# Patient Record
Sex: FEMALE | Race: WHITE | NOT HISPANIC OR LATINO | Employment: PART TIME | URBAN - METROPOLITAN AREA
[De-identification: names, ages, dates, MRNs, and addresses within clinical notes are randomized per-mention and may not be internally consistent; named-entity substitution may affect disease eponyms.]

---

## 2017-01-12 ENCOUNTER — ALLSCRIPTS OFFICE VISIT (OUTPATIENT)
Dept: OTHER | Facility: OTHER | Age: 57
End: 2017-01-12

## 2017-02-15 ENCOUNTER — ALLSCRIPTS OFFICE VISIT (OUTPATIENT)
Dept: OTHER | Facility: OTHER | Age: 57
End: 2017-02-15

## 2017-02-15 DIAGNOSIS — R31.9 HEMATURIA: ICD-10-CM

## 2017-02-15 LAB
BILIRUB UR QL STRIP: NORMAL
CLARITY UR: NORMAL
COLOR UR: YELLOW
GLUCOSE (HISTORICAL): NORMAL
HGB UR QL STRIP.AUTO: NORMAL
KETONES UR STRIP-MCNC: NORMAL MG/DL
LEUKOCYTE ESTERASE UR QL STRIP: NORMAL
NITRITE UR QL STRIP: NORMAL
PH UR STRIP.AUTO: 8 [PH]
PROT UR STRIP-MCNC: NORMAL MG/DL
SP GR UR STRIP.AUTO: 1.01
UROBILINOGEN UR QL STRIP.AUTO: NORMAL

## 2017-02-17 ENCOUNTER — GENERIC CONVERSION - ENCOUNTER (OUTPATIENT)
Dept: OTHER | Facility: OTHER | Age: 57
End: 2017-02-17

## 2017-02-17 ENCOUNTER — HOSPITAL ENCOUNTER (OUTPATIENT)
Dept: RADIOLOGY | Facility: HOSPITAL | Age: 57
Discharge: HOME | End: 2017-02-17
Attending: FAMILY MEDICINE
Payer: COMMERCIAL

## 2017-02-17 ENCOUNTER — TRANSCRIBE ORDERS (OUTPATIENT)
Dept: LAB | Facility: CLINIC | Age: 57
End: 2017-02-17

## 2017-02-17 ENCOUNTER — APPOINTMENT (OUTPATIENT)
Dept: LAB | Facility: CLINIC | Age: 57
End: 2017-02-17
Payer: COMMERCIAL

## 2017-02-17 DIAGNOSIS — E04.2 NONTOXIC MULTINODULAR GOITER: Primary | ICD-10-CM

## 2017-02-17 DIAGNOSIS — R31.9 HEMATURIA: ICD-10-CM

## 2017-02-17 LAB — VENIPUNCTURE: NORMAL

## 2017-02-17 PROCEDURE — 76770 US EXAM ABDO BACK WALL COMP: CPT

## 2017-02-17 PROCEDURE — 36415 COLL VENOUS BLD VENIPUNCTURE: CPT | Performed by: FAMILY MEDICINE

## 2017-02-20 ENCOUNTER — GENERIC CONVERSION - ENCOUNTER (OUTPATIENT)
Dept: OTHER | Facility: OTHER | Age: 57
End: 2017-02-20

## 2017-02-21 LAB
INTERPRETATION (HISTORICAL): NORMAL
QUESTION/PROBLEM (HISTORICAL): NORMAL

## 2017-02-23 LAB
CLINICIAN PROVIDIED ICD 9 OR 10 (HISTORICAL): NORMAL
DIAGNOSIS (HISTORICAL): NORMAL
ELECTRONICALLY SIGNED BY (HISTORICAL): NORMAL
GROSS DESCRIPTION (HISTORICAL): NORMAL
PERFORMED BY (HISTORICAL): NORMAL
RECOMMENDATION (HISTORICAL): NORMAL
SOURCE (HISTORICAL): NORMAL

## 2017-03-01 ENCOUNTER — GENERIC CONVERSION - ENCOUNTER (OUTPATIENT)
Dept: OTHER | Facility: OTHER | Age: 57
End: 2017-03-01

## 2017-04-04 ENCOUNTER — ALLSCRIPTS OFFICE VISIT (OUTPATIENT)
Dept: OTHER | Facility: OTHER | Age: 57
End: 2017-04-04

## 2017-04-13 ENCOUNTER — TRANSCRIBE ORDERS (OUTPATIENT)
Dept: LAB | Facility: CLINIC | Age: 57
End: 2017-04-13

## 2017-04-13 ENCOUNTER — APPOINTMENT (OUTPATIENT)
Dept: LAB | Facility: CLINIC | Age: 57
End: 2017-04-13
Payer: COMMERCIAL

## 2017-04-13 DIAGNOSIS — B35.1 DERMATOPHYTOSIS OF NAIL: Primary | ICD-10-CM

## 2017-04-13 LAB — VENIPUNCTURE: NORMAL

## 2017-04-13 PROCEDURE — 36415 COLL VENOUS BLD VENIPUNCTURE: CPT | Performed by: FAMILY MEDICINE

## 2017-04-14 LAB
A/G RATIO (HISTORICAL): 2 (ref 1.2–2.2)
ALBUMIN SERPL BCP-MCNC: 4.6 G/DL (ref 3.5–5.5)
ALP SERPL-CCNC: 51 IU/L (ref 39–117)
ALT SERPL W P-5'-P-CCNC: 20 IU/L (ref 0–32)
AST SERPL W P-5'-P-CCNC: 18 IU/L (ref 0–40)
BILIRUB SERPL-MCNC: 0.5 MG/DL (ref 0–1.2)
BUN SERPL-MCNC: 24 MG/DL (ref 6–24)
BUN/CREA RATIO (HISTORICAL): 28 (ref 9–23)
CALCIUM SERPL-MCNC: 11.3 MG/DL (ref 8.7–10.2)
CHLORIDE SERPL-SCNC: 97 MMOL/L (ref 96–106)
CO2 SERPL-SCNC: 27 MMOL/L (ref 18–29)
CREAT SERPL-MCNC: 0.87 MG/DL (ref 0.57–1)
EGFR AFRICAN AMERICAN (HISTORICAL): 84 ML/MIN/1.73
EGFR-AMERICAN CALC (HISTORICAL): 73 ML/MIN/1.73
GLUCOSE SERPL-MCNC: 103 MG/DL (ref 65–99)
POTASSIUM SERPL-SCNC: 4.6 MMOL/L (ref 3.5–5.2)
SODIUM SERPL-SCNC: 139 MMOL/L (ref 134–144)
TOT. GLOBULIN, SERUM (HISTORICAL): 2.3 G/DL (ref 1.5–4.5)
TOTAL PROTEIN (HISTORICAL): 6.9 G/DL (ref 6–8.5)

## 2017-04-17 ENCOUNTER — GENERIC CONVERSION - ENCOUNTER (OUTPATIENT)
Dept: OTHER | Facility: OTHER | Age: 57
End: 2017-04-17

## 2017-04-29 ENCOUNTER — GENERIC CONVERSION - ENCOUNTER (OUTPATIENT)
Dept: OTHER | Facility: OTHER | Age: 57
End: 2017-04-29

## 2017-04-29 LAB
A/G RATIO (HISTORICAL): 2 (ref 1.2–2.2)
ALBUMIN SERPL BCP-MCNC: 4.5 G/DL (ref 3.5–5.5)
ALP SERPL-CCNC: 49 IU/L (ref 39–117)
ALT SERPL W P-5'-P-CCNC: 19 IU/L (ref 0–32)
AST SERPL W P-5'-P-CCNC: 19 IU/L (ref 0–40)
BILIRUB SERPL-MCNC: 0.3 MG/DL (ref 0–1.2)
BUN SERPL-MCNC: 20 MG/DL (ref 6–24)
BUN/CREA RATIO (HISTORICAL): 24 (ref 9–23)
CALCIUM SERPL-MCNC: 9.1 MG/DL (ref 8.7–10.2)
CHLORIDE SERPL-SCNC: 103 MMOL/L (ref 96–106)
CO2 SERPL-SCNC: 21 MMOL/L (ref 18–29)
CREAT SERPL-MCNC: 0.83 MG/DL (ref 0.57–1)
EGFR AFRICAN AMERICAN (HISTORICAL): 89 ML/MIN/1.73
EGFR-AMERICAN CALC (HISTORICAL): 77 ML/MIN/1.73
GLUCOSE SERPL-MCNC: 96 MG/DL (ref 65–99)
POTASSIUM SERPL-SCNC: 5 MMOL/L (ref 3.5–5.2)
SODIUM SERPL-SCNC: 140 MMOL/L (ref 134–144)
TOT. GLOBULIN, SERUM (HISTORICAL): 2.2 G/DL (ref 1.5–4.5)
TOTAL PROTEIN (HISTORICAL): 6.7 G/DL (ref 6–8.5)

## 2017-04-30 LAB
25(OH)D3 SERPL-MCNC: 28.2 NG/ML (ref 30–100)
PTH-INTACT SERPL-MCNC: 43 PG/ML (ref 15–65)

## 2017-05-01 ENCOUNTER — GENERIC CONVERSION - ENCOUNTER (OUTPATIENT)
Dept: OTHER | Facility: OTHER | Age: 57
End: 2017-05-01

## 2017-05-01 ENCOUNTER — TRANSCRIBE ORDERS (OUTPATIENT)
Dept: ADMINISTRATIVE | Facility: HOSPITAL | Age: 57
End: 2017-05-01

## 2017-05-01 DIAGNOSIS — Z12.31 VISIT FOR SCREENING MAMMOGRAM: Primary | ICD-10-CM

## 2017-05-01 LAB — VITAMIN D 1,25-DIHYDROXY (HISTORICAL): 35.5 PG/ML (ref 19.9–79.3)

## 2017-05-08 ENCOUNTER — HOSPITAL ENCOUNTER (OUTPATIENT)
Dept: RADIOLOGY | Facility: HOSPITAL | Age: 57
Discharge: HOME | End: 2017-05-08
Attending: OBSTETRICS & GYNECOLOGY
Payer: COMMERCIAL

## 2017-05-08 DIAGNOSIS — Z12.31 VISIT FOR SCREENING MAMMOGRAM: ICD-10-CM

## 2017-05-08 PROCEDURE — G0202 SCR MAMMO BI INCL CAD: HCPCS

## 2017-06-08 ENCOUNTER — GENERIC CONVERSION - ENCOUNTER (OUTPATIENT)
Dept: OTHER | Facility: OTHER | Age: 57
End: 2017-06-08

## 2017-07-17 ENCOUNTER — ALLSCRIPTS OFFICE VISIT (OUTPATIENT)
Dept: OTHER | Facility: OTHER | Age: 57
End: 2017-07-17

## 2017-08-02 ENCOUNTER — ALLSCRIPTS OFFICE VISIT (OUTPATIENT)
Dept: OTHER | Facility: OTHER | Age: 57
End: 2017-08-02

## 2017-08-02 ENCOUNTER — GENERIC CONVERSION - ENCOUNTER (OUTPATIENT)
Dept: OTHER | Facility: OTHER | Age: 57
End: 2017-08-02

## 2017-08-02 LAB — ERYTHROCYTE SEDIMENTATION RATE (HISTORICAL): 4 MM/HR (ref 0–40)

## 2017-08-03 LAB
ANTINUCLEAR ANTIBODIES, IFA (HISTORICAL): NEGATIVE
LYME IGG/IGM AB (HISTORICAL): <0.91 ISR (ref 0–0.9)
LYME IGM (HISTORICAL): <0.8 INDEX (ref 0–0.79)
RA LATEX TURBID (HISTORICAL): 11.5 IU/ML (ref 0–13.9)

## 2017-08-07 ENCOUNTER — GENERIC CONVERSION - ENCOUNTER (OUTPATIENT)
Dept: OTHER | Facility: OTHER | Age: 57
End: 2017-08-07

## 2017-10-10 ENCOUNTER — ANESTHESIA EVENT (OUTPATIENT)
Dept: GASTROENTEROLOGY | Facility: AMBULARY SURGERY CENTER | Age: 57
End: 2017-10-10
Payer: COMMERCIAL

## 2017-10-10 RX ORDER — LISINOPRIL 2.5 MG/1
2.5 TABLET ORAL EVERY MORNING
COMMUNITY
End: 2018-03-19 | Stop reason: SDUPTHER

## 2017-10-10 RX ORDER — ESTRADIOL 0.1 MG/D
1 FILM, EXTENDED RELEASE TRANSDERMAL 2 TIMES WEEKLY
COMMUNITY
End: 2018-06-21 | Stop reason: ALTCHOICE

## 2017-10-10 RX ORDER — ROPINIROLE 0.5 MG/1
0.5 TABLET, FILM COATED ORAL 3 TIMES DAILY
COMMUNITY

## 2017-10-10 NOTE — ANESTHESIA PREPROCEDURE EVALUATION
Hypertension    Disease of thyroid gland nodules   Arthritis            Review of Systems/Medical History  Patient summary reviewed  Chart reviewed      Cardiovascular  Hypertension ,    Pulmonary  Negative pulmonary ROS ,        GI/Hepatic    Bowel prep            Endo/Other  History of thyroid disease , Arthritis     GYN  Negative gynecology ROS          Hematology  Negative hematology ROS      Musculoskeletal  Osteoarthritis,   Comment: Cervical dystonia, head turns to the left        Neurology   Psychology   Negative psychology ROS            Physical Exam    Airway    Mallampati score: II  TM Distance: >3 FB  Neck ROM: full     Dental       Cardiovascular  Rhythm: regular, Rate: normal,     Pulmonary  Breath sounds clear to auscultation,     Other Findings        Anesthesia Plan  ASA Score- 3       Anesthesia Type- IV sedation with anesthesia with ASA Monitors  Additional Monitors:   Airway Plan:           Induction- intravenous  Informed Consent- Anesthetic plan and risks discussed with patient

## 2017-10-10 NOTE — PRE-PROCEDURE INSTRUCTIONS
Pre-Surgery Instructions:   Medication Instructions    estradiol (VIVELLE-DOT) 0 1 MG/24HR Patient was instructed by Physician and understands   lisinopril (ZESTRIL) 2 5 mg tablet Patient was instructed by Physician and understands   rOPINIRole (REQUIP) 0 5 mg tablet Patient was instructed by Physician and understands

## 2017-10-11 ENCOUNTER — ANESTHESIA (OUTPATIENT)
Dept: GASTROENTEROLOGY | Facility: AMBULARY SURGERY CENTER | Age: 57
End: 2017-10-11
Payer: COMMERCIAL

## 2017-10-11 ENCOUNTER — HOSPITAL ENCOUNTER (OUTPATIENT)
Facility: AMBULARY SURGERY CENTER | Age: 57
Setting detail: OUTPATIENT SURGERY
Discharge: HOME | End: 2017-10-11
Attending: INTERNAL MEDICINE | Admitting: INTERNAL MEDICINE
Payer: COMMERCIAL

## 2017-10-11 ENCOUNTER — GENERIC CONVERSION - ENCOUNTER (OUTPATIENT)
Dept: OTHER | Facility: OTHER | Age: 57
End: 2017-10-11

## 2017-10-11 VITALS
HEART RATE: 60 BPM | RESPIRATION RATE: 18 BRPM | OXYGEN SATURATION: 100 % | BODY MASS INDEX: 22.44 KG/M2 | TEMPERATURE: 96.8 F | SYSTOLIC BLOOD PRESSURE: 152 MMHG | WEIGHT: 143 LBS | DIASTOLIC BLOOD PRESSURE: 72 MMHG | HEIGHT: 67 IN

## 2017-10-11 RX ORDER — PROPOFOL 10 MG/ML
INJECTION, EMULSION INTRAVENOUS AS NEEDED
Status: DISCONTINUED | OUTPATIENT
Start: 2017-10-11 | End: 2017-10-11 | Stop reason: SURG

## 2017-10-11 RX ORDER — SODIUM CHLORIDE, SODIUM LACTATE, POTASSIUM CHLORIDE, CALCIUM CHLORIDE 600; 310; 30; 20 MG/100ML; MG/100ML; MG/100ML; MG/100ML
125 INJECTION, SOLUTION INTRAVENOUS CONTINUOUS
Status: DISCONTINUED | OUTPATIENT
Start: 2017-10-11 | End: 2017-10-11 | Stop reason: HOSPADM

## 2017-10-11 RX ADMIN — SODIUM CHLORIDE, SODIUM LACTATE, POTASSIUM CHLORIDE, AND CALCIUM CHLORIDE: .6; .31; .03; .02 INJECTION, SOLUTION INTRAVENOUS at 09:46

## 2017-10-11 RX ADMIN — PROPOFOL 150 MG: 10 INJECTION, EMULSION INTRAVENOUS at 09:55

## 2017-10-11 RX ADMIN — SODIUM CHLORIDE, SODIUM LACTATE, POTASSIUM CHLORIDE, AND CALCIUM CHLORIDE 125 ML/HR: .6; .31; .03; .02 INJECTION, SOLUTION INTRAVENOUS at 09:50

## 2017-10-11 RX ADMIN — PROPOFOL 50 MG: 10 INJECTION, EMULSION INTRAVENOUS at 10:00

## 2017-10-11 NOTE — OP NOTE
COLONOSCOPY    PROCEDURE: Colonoscopy    INDICATIONS: Screening for Colon Cancer    POST-OP DIAGNOSIS: See the impression below    SEDATION: Monitored anesthesia care, check anesthesia records    PHYSICAL EXAM:    /72   Pulse 76   Temp (!) 96 8 °F (36 °C) (Tympanic)   Resp 16   Ht 5' 7" (1 702 m)   Wt 64 9 kg (143 lb)   SpO2 100%   BMI 22 40 kg/m²   Body mass index is 22 4 kg/m²  General: NAD  Heart: S1 & S2 normal, RRR  Lungs: CTA, No rales or rhonchi  Abdomen: Soft, nontender, nondistended, good bowel sounds    CONSENT:  Informed consent was obtained for the procedure, including sedation after explaining the risks and benefits of the procedure  Risks including but not limited to bleeding, perforation, infection, aspiration were discussed in detail  Also explained about less than 100%$ sensitivity with the exam and other alternatives  PREPARATION:   EKG tracing, pulse oximetry, blood pressure were monitored throughout the procedure  Patient was identified by myself both verbally and by visual inspection of ID band  DESCRIPTION:   Patient was placed in the left lateral decubitus position and was sedated with the above medication  Digital rectal examination was performed  The colonoscope was introduced in to the anal canal and advanced up to cecum, which was identified by the appendiceal orifice and IC valve  A careful inspection was made as the colonoscope was withdrawn, including a retroflexed view of the rectum; findings and interventions are described below  Appropriate photodocumentation was obtained  The quality of the colonic preparation was adequate  FINDINGS:    1  Cecum and ileocecal valve-normal mucosa    2  The rest of the colon mucosa is normal  No evidence of any polyps or other lesions  IMPRESSIONS:      As above    RECOMMENDATIONS:    Next colonoscopy in 10 years    COMPLICATIONS:  None; patient tolerated the procedure well      DISPOSITION: PACU CONDITION: Stable

## 2017-10-11 NOTE — ANESTHESIA POSTPROCEDURE EVALUATION
Post-Op Assessment Note      CV Status:  Stable    Mental Status:  Awake and alert    Hydration Status:  Stable    PONV Controlled:  None    Airway Patency:  Patent    Post Op Vitals Reviewed: Yes          Staff: Anesthesiologist           BP      Temp     Pulse     Resp      SpO2

## 2017-10-11 NOTE — H&P
History and Physical - [facility]  Jacob Signs 59 y o  female MRN: [id number]        HPI:  33-year-old female with history of hypertension was referred for colonoscopy  Patient had colonoscopy many years ago and she thinks about 10 years  Patient has regular bowel movements and denies any blood or mucus in the stool  Appetite is good and denies any recent weight loss  Denies any abdominal pain, nausea, or vomiting  Has no heartburn or acid reflux  Denies any difficulty swallowing  Historical Information   Past Medical History:   Diagnosis Date    Arthritis     Cervical dystonia     head turns to the left    Disease of thyroid gland     nodules    Hypertension      Past Surgical History:   Procedure Laterality Date    BREAST SURGERY Left     cyst benign     SECTION      bikini    DILATION AND CURETTAGE OF UTERUS      HYSTERECTOMY       Social History   History   Alcohol Use    Yes     Comment: socially     History   Drug Use No     History   Smoking Status    Former Smoker    Quit date:    Smokeless Tobacco    Never Used     Family History   Problem Relation Age of Onset    Hypertension Mother     Hyperlipidemia Mother     Cancer Father      lung and brain    Diverticulitis Sister     No Known Problems Daughter     Diverticulitis Paternal Grandmother     Suicide Attempts Sister     No Known Problems Daughter        Meds/Allergies     Prescriptions Prior to Admission   Medication    estradiol (VIVELLE-DOT) 0 1 MG/24HR    lisinopril (ZESTRIL) 2 5 mg tablet    rOPINIRole (REQUIP) 0 5 mg tablet       No Known Allergies    Objective     Blood pressure 130/72, pulse 76, temperature (!) 96 8 °F (36 °C), temperature source Tympanic, resp  rate 16, height 5' 7" (1 702 m), weight 64 9 kg (143 lb), SpO2 100 %      PHYSICAL EXAM:    Gen: NAD  CV: S1 & S2 normal, RRR  CHEST: Clear to auscultate  ABD: soft, NT/ND, good bowel sounds  EXT: no edema    ASSESSMENT: Screening for colon cancer    PLAN:    Colonoscopy

## 2017-10-16 ENCOUNTER — ALLSCRIPTS OFFICE VISIT (OUTPATIENT)
Dept: OTHER | Facility: OTHER | Age: 57
End: 2017-10-16

## 2018-01-10 NOTE — RESULT NOTES
Discussion/Summary   Your labs are stable  Please follow up as we discussed at your last appointment  Dr Krystal Carrillo      Verified Results  [facility]) Sedimentation Rate-Westergren [id number] 02:21PM MR Presta     Test Name Result Flag Reference   Sedimentation Rate-Westergren 4 mm/hr  0-40     (LC) Rheumatoid Arthritis Factor 02Aug2017 02:21PM MR Presta     Test Name Result Flag Reference   RA Latex Turbid  11 5 IU/mL  0 0-13 9     (LC) Lyme Ab/Western Blot Reflex 02Aug2017 02:21PM MR Presta     Test Name Result Flag Reference   Lyme IgG/IgM Ab <0 91 ISR  0 00-0 90   Negative         <0 91                                                 Equivocal  0 91 - 1 09                                                 Positive         >1 09   Lyme Disease Ab, Quant, IgM <0 80 index  0 00-0 79   Negative         <0 80                                                 Equivocal  0 80 - 1 19                                                 Positive         >1 19                  IgM levels may peak at 3-6 weeks post infection, then                  gradually decline       (LC) Antinuclear Antibodies, IFA [id number] 02:21PM MR Presta     Test Name Result Flag Reference   Antinuclear Antibodies, IFA Negative     Negative   <1:80                                                      Borderline  1:80                                                      Positive   >1:80

## 2018-01-10 NOTE — RESULT NOTES
Discussion/Summary   Your labs are stable  Please follow up as we discussed at your last appointment  Dr Elina Guzman      Verified Results  (1) COMPREHENSIVE METABOLIC PANEL [id number] 21:50PI Honey Darnell     Test Name Result Flag Reference   Glucose, Serum 96 mg/dL  65-99   BUN 20 mg/dL  6-24   Creatinine, Serum 0 83 mg/dL  0 57-1 00   BUN/Creatinine Ratio 24 H 9-23   Sodium, Serum 140 mmol/L  134-144   Potassium, Serum 5 0 mmol/L  3 5-5 2   Chloride, Serum 103 mmol/L     Carbon Dioxide, Total 21 mmol/L  18-29   Calcium, Serum 9 1 mg/dL  8 7-10 2   Protein, Total, Serum 6 7 g/dL  6 0-8 5   Albumin, Serum 4 5 g/dL  3 5-5 5   Globulin, Total 2 2 g/dL  1 5-4 5   A/G Ratio 2 0  1 2-2 2   Bilirubin, Total 0 3 mg/dL  0 0-1 2   Alkaline Phosphatase, S 49 IU/L     AST (SGOT) 19 IU/L  0-40   ALT (SGPT) 19 IU/L  0-32   eGFR If NonAfricn Am 77 mL/min/1 73  >59   eGFR If Africn Am 89 mL/min/1 73  >59     (1) PTH N-TERMINAL (INTACT) 29Apr2017 07:47AM Honey Darnell     Test Name Result Flag Reference   PTH, Intact 43 pg/mL  15-65     (1) VITAMIN D 25-HYDROXY [id number] 07:47AM Honey Darnell     Test Name Result Flag Reference   Vitamin D, 25-Hydroxy 28 2 ng/mL L 30 0-100 0   Vitamin D deficiency has been defined by the [place] of  Medicine and an Endocrine Society practice guideline as a  level of serum 25-OH vitamin D less than 20 ng/mL (1,2)  The Endocrine Society went on to further define vitamin D  insufficiency as a level between 21 and 29 ng/mL (2)  1  IOM ([place] of Medicine)  2010  Dietary reference     intakes for calcium and D  [facility]: The     [employer]el[employer]  2  Ilene MF, Trino DONALDSON, Annia GUARDADO, et al      Evaluation, treatment, and prevention of vitamin D     deficiency: an Endocrine Society clinical practice     guideline  JCEM  2011 Jul; 96(7):1911-30

## 2018-01-11 NOTE — RESULT NOTES
Verified Results  [facility]) Urine Cytology [id number] 12:00AM Yoli Saleh   No  of containers  Marcine Grow 01 Sterile Cup     Test Name Result Flag Reference   Source: Comment     URINE   Clinician provided ICD10: Comment     R31 9  E04 2  Z13 0  Z13 1  Z13 220  E07 9   DIAGNOSIS: Comment     URINE  NEGATIVE FOR MALIGNANT CELLS  FEW TRANSITIONAL EPITHELIAL CELLS ARE PRESENT  PREDOMINANTLY SQUAMOUS CELLS ARE PRESENT  Recommendation: Comment     Suggest follow up as clinically appropriate     Signed out by: Kayy Sheppard MD, Pathologist   Performed by: Nick Jaime, Cytotechnologist (ASCP)   Gross description: Comment     100 CC, LIGHT YELLOW, FIXED FLD  /LCS

## 2018-01-11 NOTE — RESULT NOTES
Verified Results  [address] [id number] 07:47AM Taisha  Order Number: [id number]    - Patient Instructions: To schedule this appointment, please contact Central Scheduling at [phone]  Test Name Result Flag Reference   US KIDNEY AND BLADDER (Report)     RENAL ULTRASOUND     INDICATION: 13-year-old female with history of hematuria  COMPARISON: None  TECHNIQUE:  Ultrasound of the retroperitoneum was performed with a curvilinear transducer utilizing volumetric sweeps and still imaging techniques  FINDINGS:     KIDNEYS:   Symmetric and normal size  Right kidney: 9 9 x 4 9 cm  Normal echogenicity and contour  No suspicious masses detected  No hydronephrosis  No shadowing calculi  No perinephric fluid collections  Left kidney: 10 3 x 5 2 cm  Normal echogenicity and contour  No suspicious masses detected  A small cyst is noted at the upper pole measuring about 6 mm  It appears anechoic and simple  No hydronephrosis  No shadowing calculi  No perinephric fluid collections  URETERS:   Nonvisualized  BLADDER:    Normally distended  No focal thickening or mass lesions  Bilateral ureteral jets detected  IMPRESSION:     Tiny left renal cyst  No kidney stones are visible  No visible masses  If the patient has recurrent unexplained hematuria, renal protocol CT might provide increased sensitivity for detection of lesions          Workstation performed: [id number]     Signed by:   Janie Watson MD   2/17/17       Discussion/Summary   please call with any further questions/concerns-best regards-Dr Thierry Barrios

## 2018-01-12 VITALS
HEART RATE: 72 BPM | BODY MASS INDEX: 22.35 KG/M2 | HEIGHT: 67 IN | TEMPERATURE: 97.4 F | DIASTOLIC BLOOD PRESSURE: 80 MMHG | RESPIRATION RATE: 16 BRPM | WEIGHT: 142.4 LBS | SYSTOLIC BLOOD PRESSURE: 130 MMHG

## 2018-01-13 VITALS
DIASTOLIC BLOOD PRESSURE: 70 MMHG | WEIGHT: 141.4 LBS | TEMPERATURE: 97.4 F | SYSTOLIC BLOOD PRESSURE: 130 MMHG | BODY MASS INDEX: 22.19 KG/M2 | HEART RATE: 72 BPM | HEIGHT: 67 IN | RESPIRATION RATE: 16 BRPM

## 2018-01-13 NOTE — PROGRESS NOTES
Assessment    1  Well adult on routine health check (V70 0) (Z00 00)   2  BMI 23 0-23 9, adult (V85 1) (Z68 23)    Plan  Attention deficit disorder without hyperactivity    · Amphetamine-Dextroamphetamine 20 MG Oral Tablet (Adderall); take 1 tablet  every twelve hours  Benign essential hypertension, Hematuria, Hypercalcemia, Polyarthralgia, Thyroid  disorder    · (1) CBC/PLT/DIFF; Status:Active; Requested for:16Oct2017;    · (1) COMPREHENSIVE METABOLIC PANEL; Status:Active; Requested for:16Oct2017;    · (1) LIPID PANEL FASTING W DIRECT LDL REFLEX; Status:Active; Requested  for:16Oct2017;    · (1) TSH; Status:Active; Requested for:16Oct2017;    · (1) URINALYSIS (will reflex a microscopy if leukocytes, occult blood, protein or nitrites  are not within normal limits); Status:Active; Requested for:16Oct2017;    · (1) VITAMIN D 25-HYDROXY; Status:Active; Requested for:16Oct2017;   Encounter for screening for malignant neoplasm of colon    · Suprep Bowel Prep Kit 17 5-3 13-1 6 GM/180ML Oral Solution  Need for influenza vaccination    · Fluzone Quadrivalent 0 5 ML Intramuscular Suspension Prefilled Syringe;  INJECT 0 5  ML Intramuscular; To Be Done: 59PKE1555  Polyarthralgia    · 3 - [facility]  *  Status: Hold For - Scheduling   Requested for: 56XOI7808  are Referring to a non- Preferred Provider : Scheduling access issues  Care Summary provided  : Yes  Postmenopausal    · Estradiol 0 1 MG/24HR Transdermal Patch Weekly; APPLY 1 PATCH WEEKLY AS  DIRECTED  Well adult on routine health check    · Always use a seat belt and shoulder strap when riding or driving a motor vehicle ;  Status:Complete;   Done: 00HXZ5798   · Begin or continue regular aerobic exercise   Gradually work up to at least 3 sessions of 30  minutes of exercise a week ; Status:Complete;   Done: 49VXZ1473   · Eat a low fat and low cholesterol diet ; Status:Complete;   Done: 47PMY5706   · Regular aerobic exercise can help reduce stress ; Status:Complete;   Done: 90NBX7482   · Vitamins can help you get daily requirements that your diet may not be giving you ;  Status:Complete;   Done: 24KND9636   · We recommend that you follow these steps to lower your risk of osteoporosis  ;  Status:Complete;   Done: 32FDJ9434   · We recommend that you get 400 IU of Vitamin D each day ; Status:Complete;   Done:  65RQJ3279    Discussion/Summary  health maintenance visit Currently, she eats a healthy diet and has an adequate exercise regimen  gyn Advice and education were given regarding nutrition, weight bearing exercise, calcium supplements and vitamin D supplements  Will give referral for Dr Jaime Kimble [facility]  The patient was counseled regarding instructions for management, risk factor reductions, patient and family education, impressions, importance of compliance with treatment  Chief Complaint  patient presenting for her annual physical , declined FLu vaccine sl/cma      History of Present Illness  HM, Adult Female: The patient is being seen for a health maintenance evaluation  The last health maintenance visit was 1 year(s) ago  General Health: The patient's health since the last visit is described as good  She has regular dental visits  She denies vision problems  She denies hearing loss  Immunizations status:  declined flu  Lifestyle:  She consumes a diverse and healthy diet  She does not have any weight concerns  She exercises regularly  She does not use tobacco  She denies alcohol use  She denies drug use  Reproductive health:  s/p hyster---sees gyn  Screening: cancer screening reviewed and updated  metabolic screening reviewed and updated  risk screening reviewed and updated  Additional History:  had c-scope last week--- no polyps  has arthritis and feeling very uncomfortable  pt wants to be off medication  last mammo in may 2017  saw gyn 2 months ago             Review of Systems    Constitutional: not feeling poorly and not feeling tired  Eyes: no eyesight problems  ENT: no hearing loss  Cardiovascular: No complaints of slow heart rate, no fast heart rate, no chest pain, no palpitations, no leg claudication, no lower extremity edema  Respiratory: No complaints of shortness of breath, no wheezing, no cough, no SOB on exertion, no orthopnea, no PND  Gastrointestinal: no change in bowel or bladder habits  Musculoskeletal: arthralgias and joint stiffness  Integumentary: no rashes  Neurological: dystonia and tremor-- both are stable  Psychiatric: no anxiety and no depression  Active Problems    1  Arthritis (716 90) (M19 90)   2  Attention deficit disorder without hyperactivity (314 00) (F98 8)   3  Benign essential hypertension (401 1) (I10)   4  Benign head tremor (333 1) (G25 0)   5  Blood in the urine (599 70) (R31 9)   6  Contracture of joint of right hand (718 44) (M24 541)   7  Encounter for screening for malignant neoplasm of colon (V76 51) (Z12 11)   8  Encounter for screening mammogram for breast cancer (V76 12) (Z12 31)   9  Fatigue (780 79) (R53 83)   10  Hematuria (599 70) (R31 9)   11  Hypercalcemia (275 42) (E83 52)   12  Multiple thyroid nodules (241 1) (E04 2)   13  Need for influenza vaccination (V04 81) (Z23)   14  Onychomycosis (110 1) (B35 1)   15  Polyarthralgia (719 49) (M25 50)   16  Screening for breast cancer (V76 10) (Z12 31)   17  Spastic torticollis (333 83) (M43 6)   18  Thyroid disorder (246 9) (E07 9)   19   Trigger little finger of right hand (727 03) (M65 351)    Past Medical History    · History of Accelerated essential hypertension (401 0) (I10)   · History of Acute conjunctivitis of left eye, unspecified acute conjunctivitis type (372 00)  (H10 32)   · History of Body mass index (BMI) 22 0-22 9, adult (V85 1) (Z68 22)   · History of Body mass index (BMI) 23 0-23 9, adult (V85 1) (M93 40)   · History of interstitial nephritis (V13 09) (Z87 448)   · History of Intermittent torticollis (723 5) (M43 6)   · History of Lateral epicondylitis of right elbow (726 32) (M77 11)   · History of Myalgia (729 1) (M79 1)   · History of Need for diphtheria-tetanus-pertussis (Tdap) vaccine, adult/adolescent (V06 1)  (Z23)   · History of Need for hepatitis C screening test (V73 89) (Z11 59)   · History of Onychomycosis of toenail (110 1) (B35 1)   · History of Pain, hand joint, right (719 44) (M79 641)   · History of Paronychia of great toe, right (681 11) (L03 031)   · History of Poison ivy dermatitis (692 6) (L23 7)    Surgical History    · History of  Section   · History of Hysterectomy   · History of Total Abdominal Hysterectomy    Family History  Mother    · Denied: Family history of Crohn's disease without complication, unspecified  gastrointestinal tract location   · Family history of arthritis (V17 7) (Z82 61)   · Family history of hypertension (V17 49) (Z82 49)   · Denied: Family history of liver disease   · Family history of osteoporosis (V17 81) (Z82 62)   · Denied: Family history of Malignant neoplasm of colon, unspecified part of colon  Father    · Denied: Family history of Crohn's disease without complication, unspecified  gastrointestinal tract location   · Denied: Family history of liver disease   · Family history of malignant neoplasm (V16 9) (Z80 9)   · Denied: Family history of Malignant neoplasm of colon, unspecified part of colon  Family History    · Family history of colitis (V18 59) (Z83 79)    Social History    · Alcohol ingestion of one to four drinks per week (V69 8) (Z78 9)   · Caffeine use (V49 89) (F15 90)   · Former smoker (V15 82) (G49 142)   · Occasional alcohol use    Current Meds   1  Amphetamine-Dextroamphetamine 20 MG Oral Tablet; take 1 tablet every twelve hours; Last Rx:78Tkn7913 Ordered   2  Lisinopril 5 MG Oral Tablet; Take 1 tablet daily; Therapy: 49Sks0386 to (Evaluate:57Lmd1007)  Requested for: 95ILE8809; Last   Rx:58Yyl7766 Ordered   3  ROPINIRole HCl - 0 25 MG Oral Tablet; Therapy: (Recorded:01Oct2015) to Recorded   4  Suprep Bowel Prep Kit 17 5-3 13-1 6 GM/180ML Oral Solution; USE AS DIRECTED; Therapy: 85ZJO9483 to (Last [facility])  Requested for: 60XFO0109 Ordered    Allergies    1  No Known Drug Allergies    2  No Known Environmental Allergies   3  No Known Food Allergies    Vitals   Recorded: 03UZZ4114 01:03PM   Temperature 97 2 F   Heart Rate 72   Respiration 16   Systolic 777   Diastolic 80   Height 5 ft 7 in   Weight 147 lb 3 2 oz   BMI Calculated 23 05   BSA Calculated 1 78     Physical Exam    Constitutional   General appearance: No acute distress, well appearing and well nourished  Eyes   Conjunctiva and lids: No swelling, erythema or discharge  Pupils and irises: Equal, round, reactive to light  Ears, Nose, Mouth, and Throat   External inspection of ears and nose: Normal     Otoscopic examination: Tympanic membranes translucent with normal light reflex  Canals patent without erythema  Lips, teeth, and gums: Normal, good dentition  Oropharynx: Normal with no erythema, edema, exudate or lesions  Neck   Neck: Supple, symmetric, trachea midline, no masses  Thyroid: Normal, no thyromegaly  Pulmonary   Respiratory effort: No increased work of breathing or signs of respiratory distress  Auscultation of lungs: Clear to auscultation  Cardiovascular   Auscultation of heart: Normal rate and rhythm, normal S1 and S2, no murmurs  Carotid pulses: 2+ bilaterally  Peripheral vascular exam: Normal     Examination of extremities for edema and/or varicosities: Normal     Abdomen   Abdomen: Non-tender, no masses  Lymphatic   Palpation of lymph nodes in neck: No lymphadenopathy  Musculoskeletal   Gait and station: Normal     Digits and nails: Normal without clubbing or cyanosis  Joints, bones, and muscles: Normal     Neurologic   Cranial nerves: Cranial nerves II-XII intact      Cortical function: Normal mental status  Reflexes: 2+ and symmetric  Psychiatric   Judgment and insight: Normal     Mood and affect: Normal        Results/Data  * MAMMO SCREENING BILATERAL W CAD [id number] 04:18PM EPIC, Provider   Test ordered by: Lorena Mckinnon Name Result Flag Reference   MAMMO SCREENING BILATERAL W CAD (Report)     Patient History:   Patient is postmenopausal and had first child at age 26  Taking estrogen for 3 years  Patient's BMI is 22 7  Reason for exam: screening, asymptomatic  Mammo Screening Bilateral W CAD: May 8, 2017 - Check In #:    [de-identified]   Bilateral MLO, CC, and XCCL view(s) were taken  Technologist: PADMA Brady   The breast tissue is heterogeneously dense, potentially limiting    the sensitivity of mammography  Patient risk, included in this    report, assists in determining the appropriate screening regimen    (such as 3-D mammography or the inclusion of automated breast    ultrasound or MRI)  3-D mammography may also remain indicated as    screening  No dominant soft tissue mass, architectural distortion or    suspicious calcifications are noted  The skin and nipple    structures are within normal limits  Scattered benign appearing    calcifications are noted  No mammographic evidence of malignancy  No    significant changes when compared with prior studies  ACR BI-RADSï¾® Assessments: BiRad:2 - Benign     Recommendation:   Routine screening mammogram of both breasts in 1 year  A    reminder letter will be sent  Analyzed by CAD     8-10% of cancers will be missed on mammography  Management of a    palpable abnormality must be based on clinical grounds  Patients   will be notified of their results via letter from our facility  Accredited by [employer] of [employer] and FDA       Transcription Location: [address] [place], [place] [address]way: [id number]     Risk Value(s):   Shaguftaer-Adelita 10 Year: 3 500%, Tyrer-Cuvenice Lifetime: 9 200%,    Myriad Table: 1 5%, PEDRO 5 Year: 1 7%, NCI Lifetime: 9 4%   Signed by:    Anyi Sethi MD   5/15/17       Future Appointments    Date/Time Provider Specialty Site   01/15/2018 01:30 PM Deysi Arora DO Family Medicine [facility]     Signatures   Electronically signed by : Nathaniel Alexandre DO; Oct 16 2017  1:32PM EST                       (Author)

## 2018-01-13 NOTE — PROGRESS NOTES
Assessment    1  Encounter for preventive health examination (V70 0) (Z00 00)   2  Body mass index (BMI) 22 0-22 9, adult (V85 1) (Z68 22)    Plan  Attention deficit disorder without hyperactivity    · Amphetamine-Dextroamphetamine 20 MG Oral Tablet (Adderall); take 1 tablet  every twelve hours   · *1 - [address] [facility] Physician Referral  Consult  Status: Hold  For - Scheduling  Requested for: [id number]  Health Referral Questions : Patient requires Psychiatry assessment/intake      appointment (with MD/DO)  Care Summary provided  : Yes  Encounter for screening mammogram for breast cancer, Screening for breast cancer    · * MAMMO SCREENING BILATERAL W CAD; Status:Hold For - Scheduling; Requested  for:13Oct2016;   Multiple thyroid nodules    · US THYROID; Status:Hold For - Scheduling; Requested for:13Oct2016;   Multiple thyroid nodules, Screening for deficiency anemia, Screening for diabetes  mellitus (DM), Screening for lipoid disorders, Thyroid disorder    · (1) CBC/PLT/DIFF; Status:Active; Requested for:13Oct2016;    · (1) COMPREHENSIVE METABOLIC PANEL; Status:Active; Requested for:13Oct2016;    · (1) LIPID PANEL FASTING W DIRECT LDL REFLEX; Status:Active; Requested  for:13Oct2016;    · (1) TSH; Status:Active; Requested for:13Oct2016;    · (1) URINALYSIS (will reflex a microscopy if leukocytes, occult blood, protein or nitrites  are not within normal limits); Status:Active;  Requested for:13Oct2016;   PMH: Intermittent torticollis    · LORazepam 0 5 MG Oral Tablet; take 1 tablet daily prn  Screening for colorectal cancer    · [facility]  (Gastroenterology) Physician Referral  Consult  Status: Hold For  - Scheduling  Requested for: [id number]  are Referring to a non- Preferred Provider : Patient refused suggestion for      recommended provider  Care Summary provided  : Yes   · Follow-up PRN Evaluation and Treatment  Follow-up  Status: Complete  Done:  [id number]   · COLONOSCOPY; Status:Active; Requested for:89Lwi0454;     Discussion/Summary  health maintenance visit Currently, she eats a healthy diet and has an adequate exercise regimen  the risks and benefits of cervical cancer screening were discussed sees gyn Breast cancer screening: mammogram has been ordered  Colorectal cancer screening: colonoscopy has been ordered  Screening lab work includes hemoglobin and glucose  The patient declines immunizations  Advice and education were given regarding calcium supplements and vitamin D supplements  Patient discussion: discussed with the patient  Doing well  will give c-scope and referral for new GI  mammogram script given  bw and u/s of thryoid script given      hold starting antifungal before bw is resulted  when started  will needed bw every 6 weeks    f/u as needed  The patient was counseled regarding diagnostic results, instructions for management, risk factor reductions, prognosis, patient and family education, impressions, risks and benefits of treatment options, importance of compliance with treatment  Chief Complaint  patient presenting for her annual physical sl/cma      History of Present Illness  HM, Adult Female: The patient is being seen for a health maintenance evaluation  Social History: Household members include domestic partner  Work status: not working outside the home  The patient has never smoked cigarettes  She reports occasional alcohol use  She has never used illicit drugs  General Health: The patient's health since the last visit is described as good  She has regular dental visits  She denies vision problems  She denies hearing loss  Lifestyle:  She consumes a diverse and healthy diet  She does not have any weight concerns  She exercises regularly  She does not use tobacco  She denies alcohol use  She denies drug use  Screening: cancer screening reviewed and updated  metabolic screening reviewed and updated  risk screening reviewed and updated  HPI: Pt seen and examined  Does see gyn  Pt was uncomfortable and didn't get the c-scope  Would like new referral    will give script for mammogram     declined flu shot  Pt stated that she has had "growth" on thryoid  Reviewed u/s pt had which was consistent with thyroiditis  pt didn't seek endocrine consult  Pt has spasmatic torticollis  Sees neuro  will be trying botox   Has toenail fungus and was given a script for antifungal but didn't get bw done  Pt takes both adderall and ativan  Pt is taking ativan when torticollis is bad  Taking it approx 3x a week  Takes adderall 20mg 2x a day  takes occasional holidays  Pt was dx by psychiatry Barbara Sanabria in [place] ago  Pt will get us records to show dx  Review of Systems    Constitutional: not feeling poorly and not feeling tired  Eyes: no eyesight problems  ENT: no hearing loss  Cardiovascular: No complaints of slow heart rate, no fast heart rate, no chest pain, no palpitations, no leg claudication, no lower extremity edema  Respiratory: No complaints of shortness of breath, no wheezing, no cough, no SOB on exertion, no orthopnea, no PND  Gastrointestinal: no change  Genitourinary: no change  Musculoskeletal: as noted in HPI, no arthralgias and no myalgias  Integumentary: no rashes  Neurological: as noted in HPI  Psychiatric: as noted in HPI  Hematologic/Lymphatic: no tendency for easy bleeding and no tendency for easy bruising  Active Problems    1  Arthritis (716 90) (M19 90)   2  Attention deficit disorder without hyperactivity (314 00) (F90 0)   3  Screening for breast cancer (V76 10) (Z12 39)   4  Screening for colorectal cancer (V76 51) (Z12 11,Z12 12)   5  Screening for deficiency anemia (V78 1) (Z13 0)   6  Screening for diabetes mellitus (DM) (V77 1) (Z13 1)   7  Screening for lipoid disorders (V77 91) (Z13 220)   8  Thyroid disorder (246 9) (E07 9)   9   Trigger little finger of right hand (727 03) (M65 351)    Past Medical History    · History of Acute conjunctivitis of left eye, unspecified acute conjunctivitis type (372 00)  (H10 32)   · History of Contracture of joint of right hand (718 44) (M24 541)   · History of Intermittent torticollis (723 5) (M43 6)   · History of Myalgia (729 1) (M79 1)   · History of Need for diphtheria-tetanus-pertussis (Tdap) vaccine, adult/adolescent (V06 1)  (Z23)   · History of Need for hepatitis C screening test (V73 89) (Z11 59)   · History of Onychomycosis of toenail (110 1) (B35 1)   · History of Pain, hand joint, right (719 44) (M79 641)   · History of Poison ivy dermatitis (692 6) (L23 7)    Surgical History    · History of  Section   · History of Hysterectomy   · History of Total Abdominal Hysterectomy    Family History  Mother    · Denied: Family history of Crohn's disease without complication, unspecified  gastrointestinal tract location   · Family history of arthritis (V17 7) (Z82 61)   · Family history of hypertension (V17 49) (Z82 49)   · Denied: Family history of liver disease   · Family history of osteoporosis (V17 81) (Z82 62)   · Denied: Family history of Malignant neoplasm of colon, unspecified part of colon  Father    · Denied: Family history of Crohn's disease without complication, unspecified  gastrointestinal tract location   · Denied: Family history of liver disease   · Family history of malignant neoplasm (V16 9) (Z80 9)   · Denied: Family history of Malignant neoplasm of colon, unspecified part of colon  Family History    · Family history of colitis (V18 59) (Z83 79)    Social History    · Alcohol ingestion of one to four drinks per week (V69 8) (Z78 9)   · Caffeine use (V49 89) (F15 90)   · Former smoker (V15 82) (R48 806)   · Never a smoker   · Occasional alcohol use    Current Meds   1  Amphetamine-Dextroamphetamine 20 MG Oral Tablet; take 1 tablet every twelve hours; Last Rx:[id number] Ordered   2   Ibuprofen 600 MG Oral Tablet; TAKE 1 TABLET 3 TIMES DAILY AS NEEDED; Therapy: 97GUX7974 to (Mayme Peer)  Requested for: 79VKH0810; Last   Rx:08Jun2016 Ordered   3  LORazepam 0 5 MG Oral Tablet; take 1 tablet daily prn; Therapy: 23YZP1083 to (Evaluate:43Iph5719); Last Rx:17Cju3957; Status: ACTIVE -   Renewal Voided Ordered   4  ROPINIRole HCl - 0 25 MG Oral Tablet; Therapy: (Recorded:01Oct2015) to Recorded    Allergies    1  No Known Drug Allergies    2  No Known Environmental Allergies   3  No Known Food Allergies    Vitals   Recorded: 08RCN9419 29:41CV   Systolic 239   Diastolic 90   Heart Rate 89   Respiration 16   Temperature 97 8 F   Height 5 ft 7 in   Weight 146 lb    BMI Calculated 22 87   BSA Calculated 1 77     Physical Exam    Constitutional   General appearance: No acute distress, well appearing and well nourished  Head and Face   Head and face: Normal     Eyes   Conjunctiva and lids: No swelling, erythema or discharge  Pupils and irises: Equal, round, reactive to light  Ears, Nose, Mouth, and Throat   External inspection of ears and nose: Normal     Otoscopic examination: Tympanic membranes translucent with normal light reflex  Canals patent without erythema  Nasal mucosa, septum, and turbinates: Normal without edema or erythema  Lips, teeth, and gums: Normal, good dentition  Oropharynx: Normal with no erythema, edema, exudate or lesions  Neck   Neck: Supple, symmetric, trachea midline, no masses  Thyroid: Normal, no thyromegaly  Pulmonary   Respiratory effort: No increased work of breathing or signs of respiratory distress  Auscultation of lungs: Clear to auscultation  Cardiovascular   Auscultation of heart: Normal rate and rhythm, normal S1 and S2, no murmurs  Carotid pulses: 2+ bilaterally  Abdominal aorta: Normal     Peripheral vascular exam: Normal     Examination of extremities for edema and/or varicosities: Normal     Abdomen   Abdomen: Non-tender, no masses      Liver and spleen: No hepatomegaly or splenomegaly  Lymphatic   Palpation of lymph nodes in neck: No lymphadenopathy  Neurologic   Reflexes: 2+ and symmetric  Sensation: No sensory loss  Psychiatric   Judgment and insight: Normal     Orientation to person, place, and time: Normal     Recent and remote memory: Intact  Mood and affect: Normal        Results/Data  PHQ-2 Adult Depression Screening 13Oct2016 09:05AM User, Ahs     Test Name Result Flag Reference   PHQ-2 Adult Depression Score 0     Over the last two weeks, how often have you been bothered by any of the following problems? Little interest or pleasure in doing things: Not at all - 0  Feeling down, depressed, or hopeless: Not at all - 0   PHQ-2 Adult Depression Screening Negative         Procedure    Procedure: Visual Acuity Test    Indication: routine screening  Inforrmation supplied by a Snellen chart  Results: 20/20 in both eyes with corrective device, 20/20 in the right eye with corrective device, 20/20 in the left eye with corrective device normal in both eyes  Color vision was screened with the [place] Test and the results were normal    The patient was cooperative  Future Appointments    Date/Time Provider Specialty Site   10/31/2016 03:45 PM PILLO Russell   Orthopedic [address] [place] [address]     Signatures   Electronically signed by : Jorje Shrestha DO; Oct 13 2016  1:57PM EST                       (Author)

## 2018-01-14 VITALS
DIASTOLIC BLOOD PRESSURE: 90 MMHG | TEMPERATURE: 97.8 F | HEIGHT: 67 IN | BODY MASS INDEX: 23.04 KG/M2 | WEIGHT: 146.8 LBS | RESPIRATION RATE: 16 BRPM | HEART RATE: 74 BPM | OXYGEN SATURATION: 98 % | SYSTOLIC BLOOD PRESSURE: 140 MMHG

## 2018-01-14 VITALS
SYSTOLIC BLOOD PRESSURE: 140 MMHG | TEMPERATURE: 98.7 F | HEART RATE: 77 BPM | RESPIRATION RATE: 16 BRPM | HEIGHT: 67 IN | BODY MASS INDEX: 23.39 KG/M2 | DIASTOLIC BLOOD PRESSURE: 80 MMHG | WEIGHT: 149 LBS

## 2018-01-15 VITALS
BODY MASS INDEX: 22.76 KG/M2 | WEIGHT: 145 LBS | SYSTOLIC BLOOD PRESSURE: 158 MMHG | RESPIRATION RATE: 16 BRPM | HEIGHT: 67 IN | TEMPERATURE: 97.1 F | DIASTOLIC BLOOD PRESSURE: 98 MMHG | HEART RATE: 80 BPM

## 2018-01-15 NOTE — RESULT NOTES
Verified Results  (1) COMPREHENSIVE METABOLIC PANEL [id number] 49:88SN Viv Hampton     Test Name Result Flag Reference   Glucose, Serum 103 mg/dL H 65-99   BUN 24 mg/dL  6-24   Creatinine, Serum 0 87 mg/dL  0 57-1 00   eGFR If NonAfricn Am 73 mL/min/1 73  >59   eGFR If Africn Am 84 mL/min/1 73  >59   BUN/Creatinine Ratio 28 H 9-23   Sodium, Serum 139 mmol/L  134-144   Potassium, Serum 4 6 mmol/L  3 5-5 2   Chloride, Serum 97 mmol/L     Carbon Dioxide, Total 27 mmol/L  18-29   Calcium, Serum 11 3 mg/dL H 8 7-10 2   Protein, Total, Serum 6 9 g/dL  6 0-8 5   Albumin, Serum 4 6 g/dL  3 5-5 5   Globulin, Total 2 3 g/dL  1 5-4 5   A/G Ratio 2 0  1 2-2 2   Bilirubin, Total 0 5 mg/dL  0 0-1 2   Alkaline Phosphatase, S 51 IU/L     AST (SGOT) 18 IU/L  0-40   ALT (SGPT) 20 IU/L  0-32        pt should stop calcium supplements  rl    Plan  Hypercalcemia    · (1) COMPREHENSIVE METABOLIC PANEL; Status:Active; Requested for:98Fib4770;    · (1) PTH N-TERMINAL (INTACT); Status:Active; Requested for:57Nsz4555;    · (1) VITAMIN D 125-DIHYDROXY (CALCITRIOL); Status:Active; Requested  for:73Cyh9532;    · (1) VITAMIN D 25-HYDROXY; Status:Active;  Requested for:51Hhr7806;

## 2018-01-22 VITALS
BODY MASS INDEX: 23.1 KG/M2 | TEMPERATURE: 97.2 F | RESPIRATION RATE: 16 BRPM | SYSTOLIC BLOOD PRESSURE: 130 MMHG | WEIGHT: 147.2 LBS | DIASTOLIC BLOOD PRESSURE: 80 MMHG | HEART RATE: 72 BPM | HEIGHT: 67 IN

## 2018-02-20 ENCOUNTER — OFFICE VISIT (OUTPATIENT)
Dept: FAMILY MEDICINE CLINIC | Facility: CLINIC | Age: 58
End: 2018-02-20
Payer: COMMERCIAL

## 2018-02-20 VITALS
TEMPERATURE: 97.4 F | WEIGHT: 147 LBS | RESPIRATION RATE: 16 BRPM | DIASTOLIC BLOOD PRESSURE: 80 MMHG | BODY MASS INDEX: 23.07 KG/M2 | SYSTOLIC BLOOD PRESSURE: 140 MMHG | HEIGHT: 67 IN | HEART RATE: 74 BPM

## 2018-02-20 DIAGNOSIS — R53.83 FATIGUE, UNSPECIFIED TYPE: ICD-10-CM

## 2018-02-20 DIAGNOSIS — M25.50 POLYARTHRALGIA: ICD-10-CM

## 2018-02-20 DIAGNOSIS — I10 BENIGN ESSENTIAL HYPERTENSION: Primary | ICD-10-CM

## 2018-02-20 DIAGNOSIS — F98.8 ATTENTION DEFICIT DISORDER WITHOUT HYPERACTIVITY: ICD-10-CM

## 2018-02-20 PROBLEM — G25.0 BENIGN HEAD TREMOR: Status: ACTIVE | Noted: 2017-07-17

## 2018-02-20 PROBLEM — E83.52 HYPERCALCEMIA: Status: ACTIVE | Noted: 2017-04-17

## 2018-02-20 PROCEDURE — 3008F BODY MASS INDEX DOCD: CPT | Performed by: FAMILY MEDICINE

## 2018-02-20 PROCEDURE — 99214 OFFICE O/P EST MOD 30 MIN: CPT | Performed by: FAMILY MEDICINE

## 2018-02-20 RX ORDER — DEXTROAMPHETAMINE SACCHARATE, AMPHETAMINE ASPARTATE, DEXTROAMPHETAMINE SULFATE AND AMPHETAMINE SULFATE 5; 5; 5; 5 MG/1; MG/1; MG/1; MG/1
1 TABLET ORAL
COMMUNITY
End: 2018-02-20 | Stop reason: SDUPTHER

## 2018-02-20 RX ORDER — DEXTROAMPHETAMINE SACCHARATE, AMPHETAMINE ASPARTATE, DEXTROAMPHETAMINE SULFATE AND AMPHETAMINE SULFATE 5; 5; 5; 5 MG/1; MG/1; MG/1; MG/1
1 TABLET ORAL
Qty: 60 TABLET | Refills: 0 | Status: SHIPPED | OUTPATIENT
Start: 2018-02-20 | End: 2018-03-20 | Stop reason: SDUPTHER

## 2018-02-20 NOTE — PROGRESS NOTES
Chief Complaint   Patient presents with    Follow-up     bp check     Medication Refill        Patient ID: Ashley Michaud is a 59 y o  female  Pt seen and examined  Here for bp check  Pt didn't get bw done in October  Pt is complaining of fatigue  Still with joint pain and weakness  Pt was given a referral for rheum but didn't go  Hypertension   This is a chronic problem  The problem is unchanged  The problem is controlled  Associated symptoms include malaise/fatigue  Pertinent negatives include no anxiety, blurred vision, chest pain, headaches, neck pain, orthopnea, palpitations, peripheral edema, PND, shortness of breath or sweats  Past treatments include ACE inhibitors  Fatigue   This is a chronic problem  The current episode started more than 1 month ago  The problem occurs constantly  Associated symptoms include arthralgias and fatigue  Pertinent negatives include no abdominal pain, anorexia, change in bowel habit, chest pain, chills, congestion, coughing, diaphoresis, fever, headaches, joint swelling, myalgias, nausea, neck pain, numbness, rash, vertigo, visual change, vomiting or weakness  Nothing aggravates the symptoms  She has tried nothing for the symptoms  The following portions of the patient's history were reviewed and updated as appropriate: allergies, current medications, past family history, past medical history, past social history, past surgical history and problem list     Review of Systems   Constitutional: Positive for fatigue and malaise/fatigue  Negative for activity change, appetite change, chills, diaphoresis and fever  HENT: Negative for congestion, trouble swallowing and voice change  Eyes: Negative for blurred vision and visual disturbance  Respiratory: Negative for cough and shortness of breath  Cardiovascular: Negative for chest pain, palpitations, orthopnea and PND     Gastrointestinal: Negative for abdominal pain, anorexia, change in bowel habit, nausea and vomiting  Musculoskeletal: Positive for arthralgias  Negative for joint swelling, myalgias and neck pain  Skin: Negative for rash  Neurological: Positive for tremors  Negative for vertigo, weakness, numbness and headaches  Psychiatric/Behavioral: Negative for dysphoric mood  Current Outpatient Prescriptions   Medication Sig Dispense Refill    estradiol (VIVELLE-DOT) 0 1 MG/24HR Place 1 patch on the skin 2 (two) times a week      lisinopril (ZESTRIL) 2 5 mg tablet Take 2 5 mg by mouth every morning      rOPINIRole (REQUIP) 0 5 mg tablet Take 0 5 mg by mouth 3 (three) times a day      amphetamine-dextroamphetamine (ADDERALL) 20 mg tablet Take 1 tablet (20 mg total) by mouth 2 (two) times a day Max Daily Amount: 40 mg 60 tablet 0     No current facility-administered medications for this visit  Objective:    /80 (BP Location: Left arm, Patient Position: Sitting, Cuff Size: Standard)   Pulse 74   Temp (!) 97 4 °F (36 3 °C) (Temporal)   Resp 16   Ht 5' 7" (1 702 m)   Wt 66 7 kg (147 lb)   BMI 23 02 kg/m²        Physical Exam   Constitutional: She is oriented to person, place, and time  She appears well-developed and well-nourished  Eyes: Conjunctivae are normal    Neck: Normal range of motion  No thyromegaly present  Cardiovascular: Normal rate, regular rhythm, normal heart sounds and intact distal pulses  Pulmonary/Chest: Effort normal and breath sounds normal    Abdominal: Soft  Musculoskeletal: She exhibits no edema  Neurological: She is alert and oriented to person, place, and time  Psychiatric: She has a normal mood and affect  Vitals reviewed              Results for orders placed or performed in visit on 08/02/17   ANTINUCLEAR ANTIBODIES (LYNNETTE) (HISTORICAL)   Result Value Ref Range    Antinuclear Antibodies, IFA (HISTORICAL) Negative    RHEUMATOID FACTOR (HISTORICAL)   Result Value Ref Range    RA LATEX TURBID (HISTORICAL) 11 5 0 0 - 13 9 IU/mL   LYME AB PROFILE W/REFLEX TO WB (HISTORICAL)   Result Value Ref Range    Lyme IgG/IgM Ab <0 91 0 00 - 0 90 ISR    Lyme IgM <0 80 0 00 - 0 79 index   SEDIMENTATION RATE (HISTORICAL)   Result Value Ref Range    ERYTHROCYTE SEDIMENTATION RATE 4 0 - 40 mm/hr      Assessment/Plan:    No problem-specific Assessment & Plan notes found for this encounter  Diagnoses and all orders for this visit:    Benign essential hypertension  Comments:  stable   Orders:  -     Lipid panel; Future  -     Lipid panel    Attention deficit disorder without hyperactivity  Comments:  adderall refilled   Orders:  -     amphetamine-dextroamphetamine (ADDERALL) 20 mg tablet; Take 1 tablet (20 mg total) by mouth 2 (two) times a day Max Daily Amount: 40 mg    Fatigue, unspecified type  Comments:  referral for rheum given at last visit  advised to go  Orders:  -     CBC and differential; Future  -     Comprehensive metabolic panel; Future  -     TSH, 3rd generation with T4 reflex; Future  -     CBC and differential  -     Comprehensive metabolic panel  -     TSH, 3rd generation with T4 reflex    Polyarthralgia  Comments:  bw done in August wnl  again, see rheum    Other orders  -     Discontinue: amphetamine-dextroamphetamine (ADDERALL) 20 mg tablet; Take 1 tablet by mouth                  Return in about 3 months (around 5/20/2018)            Camila Mendiola DO

## 2018-02-20 NOTE — PATIENT INSTRUCTIONS

## 2018-03-19 DIAGNOSIS — I10 BENIGN ESSENTIAL HTN: ICD-10-CM

## 2018-03-19 DIAGNOSIS — F98.8 ATTENTION DEFICIT DISORDER, UNSPECIFIED HYPERACTIVITY PRESENCE: Primary | ICD-10-CM

## 2018-03-20 DIAGNOSIS — F98.8 ATTENTION DEFICIT DISORDER WITHOUT HYPERACTIVITY: ICD-10-CM

## 2018-03-20 RX ORDER — LISINOPRIL 2.5 MG/1
2.5 TABLET ORAL EVERY MORNING
Qty: 90 TABLET | Refills: 1 | Status: SHIPPED | OUTPATIENT
Start: 2018-03-20 | End: 2018-09-04 | Stop reason: SDUPTHER

## 2018-03-20 RX ORDER — DEXTROAMPHETAMINE SACCHARATE, AMPHETAMINE ASPARTATE, DEXTROAMPHETAMINE SULFATE AND AMPHETAMINE SULFATE 5; 5; 5; 5 MG/1; MG/1; MG/1; MG/1
10 TABLET ORAL
Qty: 30 TABLET | Refills: 0 | Status: SHIPPED | OUTPATIENT
Start: 2018-03-20 | End: 2018-03-20

## 2018-03-20 RX ORDER — DEXTROAMPHETAMINE SACCHARATE, AMPHETAMINE ASPARTATE, DEXTROAMPHETAMINE SULFATE AND AMPHETAMINE SULFATE 5; 5; 5; 5 MG/1; MG/1; MG/1; MG/1
1 TABLET ORAL
Qty: 60 TABLET | Refills: 0 | Status: SHIPPED | OUTPATIENT
Start: 2018-03-20 | End: 2018-04-19 | Stop reason: SDUPTHER

## 2018-04-19 DIAGNOSIS — F98.8 ATTENTION DEFICIT DISORDER WITHOUT HYPERACTIVITY: ICD-10-CM

## 2018-04-19 RX ORDER — DEXTROAMPHETAMINE SACCHARATE, AMPHETAMINE ASPARTATE, DEXTROAMPHETAMINE SULFATE AND AMPHETAMINE SULFATE 5; 5; 5; 5 MG/1; MG/1; MG/1; MG/1
1 TABLET ORAL
Qty: 60 TABLET | Refills: 0 | Status: SHIPPED | OUTPATIENT
Start: 2018-04-19 | End: 2018-05-18 | Stop reason: SDUPTHER

## 2018-05-18 DIAGNOSIS — F98.8 ATTENTION DEFICIT DISORDER WITHOUT HYPERACTIVITY: ICD-10-CM

## 2018-05-18 RX ORDER — DEXTROAMPHETAMINE SACCHARATE, AMPHETAMINE ASPARTATE, DEXTROAMPHETAMINE SULFATE AND AMPHETAMINE SULFATE 5; 5; 5; 5 MG/1; MG/1; MG/1; MG/1
1 TABLET ORAL
Qty: 60 TABLET | Refills: 0 | Status: SHIPPED | OUTPATIENT
Start: 2018-05-18 | End: 2018-06-18 | Stop reason: SDUPTHER

## 2018-05-18 RX ORDER — DEXTROAMPHETAMINE SACCHARATE, AMPHETAMINE ASPARTATE, DEXTROAMPHETAMINE SULFATE AND AMPHETAMINE SULFATE 5; 5; 5; 5 MG/1; MG/1; MG/1; MG/1
1 TABLET ORAL
Qty: 60 TABLET | Refills: 0 | Status: SHIPPED | OUTPATIENT
Start: 2018-05-18 | End: 2018-05-18 | Stop reason: SDUPTHER

## 2018-05-18 NOTE — TELEPHONE ENCOUNTER
Dr Howard Causey,  Can you add vit d to lab order from February? Also, she is requesting aderrall refill

## 2018-05-26 ENCOUNTER — OFFICE VISIT (OUTPATIENT)
Dept: FAMILY MEDICINE CLINIC | Facility: CLINIC | Age: 58
End: 2018-05-26
Payer: COMMERCIAL

## 2018-05-26 VITALS
WEIGHT: 153 LBS | BODY MASS INDEX: 23.96 KG/M2 | HEART RATE: 72 BPM | TEMPERATURE: 98.1 F | RESPIRATION RATE: 12 BRPM | SYSTOLIC BLOOD PRESSURE: 150 MMHG | DIASTOLIC BLOOD PRESSURE: 90 MMHG

## 2018-05-26 DIAGNOSIS — R39.9 URINARY TRACT INFECTION SYMPTOMS: ICD-10-CM

## 2018-05-26 DIAGNOSIS — R35.0 FREQUENCY OF URINATION: Primary | ICD-10-CM

## 2018-05-26 LAB
SL AMB  POCT GLUCOSE, UA: NORMAL
SL AMB LEUKOCYTE ESTERASE,UA: NORMAL
SL AMB POCT BILIRUBIN,UA: NORMAL
SL AMB POCT BLOOD,UA: NORMAL
SL AMB POCT CLARITY,UA: CLEAR
SL AMB POCT COLOR,UA: YELLOW
SL AMB POCT KETONES,UA: NORMAL
SL AMB POCT NITRITE,UA: NORMAL
SL AMB POCT PH,UA: 7
SL AMB POCT SPECIFIC GRAVITY,UA: 1.01
SL AMB POCT URINE PROTEIN: NORMAL
SL AMB POCT UROBILINOGEN: NORMAL

## 2018-05-26 PROCEDURE — 81003 URINALYSIS AUTO W/O SCOPE: CPT | Performed by: FAMILY MEDICINE

## 2018-05-26 PROCEDURE — 99213 OFFICE O/P EST LOW 20 MIN: CPT | Performed by: FAMILY MEDICINE

## 2018-05-26 NOTE — PATIENT INSTRUCTIONS
Urinary Tract Infection in Women, Ambulatory Care   GENERAL INFORMATION:   A urinary tract infection (UTI)  is caused by bacteria that get inside your urinary tract  Most bacteria that enter your urinary tract are expelled when you urinate  If the bacteria stay in your urinary tract, you may get an infection  Your urinary tract includes your kidneys, ureters, bladder, and urethra  Urine is made in your kidneys, and it flows from the ureters to the bladder  Urine leaves the bladder through the urethra  A UTI is more common in your lower urinary tract, which includes your bladder and urethra  Common symptoms include the following:   · Urinating more often or waking from sleep to urinate    · Pain or burning when you urinate    · Pain or pressure in your lower abdomen     · Urine that smells bad    · Blood in your urine    · Leaking urine  Seek immediate care for the following symptoms:   · Urinating very little or not at all    · Vomiting    · Shaking chills with a fever    · Side or back pain that gets worse  Treatment for a UTI  may include medicines to treat a bacterial infection  You may also need medicines to decrease pain and burning, or decrease the urge to urinate often  Prevent a UTI:   · Urinate when you feel the urge  Do not hold your urine  Urinate as soon as you feel you have to  · Drink liquids as directed  Ask how much liquid to drink each day and which liquids are best for you  You may need to drink more fluids than usual to help flush out the bacteria  Do not drink alcohol, caffeine, and citrus juices  These can irritate your bladder and increase your symptoms  · Apply heat  on your abdomen for 20 to 30 minutes every 2 hours for as many days as directed  Heat helps decrease discomfort and pressure in your bladder  Follow up with your healthcare provider as directed:  Write down your questions so you remember to ask them during your visits     CARE AGREEMENT:   You have the right to help plan your care  Learn about your health condition and how it may be treated  Discuss treatment options with your caregivers to decide what care you want to receive  You always have the right to refuse treatment  The above information is an  only  It is not intended as medical advice for individual conditions or treatments  Talk to your doctor, nurse or pharmacist before following any medical regimen to see if it is safe and effective for you  © 2014 [address] is for End User's use only and may not be sold, redistributed or otherwise used for commercial purposes  All illustrations and images included in CareNotes® are the copyrighted property of [employer]  or Toribio Morillo

## 2018-05-26 NOTE — PROGRESS NOTES
Chief Complaint   Patient presents with    Urinary Tract Infection        Patient ID: Toribio Marsh is a 59 y o  female  Pt seen and examined  Here for urinary symptoms   Was seen 8 days ago at [facility]  + [facility] UTI and given macrobid    Feeling mildly improved    Still has urinary symptoms  Feels uncomfortable  No fever  No burning  No n/v      Still fatigued but didn't get bw done      Urinary Tract Infection    This is a new problem  The current episode started 1 to 4 weeks ago  Pertinent negatives include no chills, discharge, flank pain, frequency, hematuria, hesitancy, nausea, possible pregnancy, sweats, urgency or vomiting  The following portions of the patient's history were reviewed and updated as appropriate: allergies, current medications, past family history, past medical history, past social history, past surgical history and problem list     Review of Systems   Constitutional: Positive for fatigue  Negative for chills and fever  Gastrointestinal: Negative for constipation, diarrhea, nausea and vomiting  Genitourinary: Negative for difficulty urinating, flank pain, frequency, hematuria, hesitancy and urgency  Current Outpatient Prescriptions   Medication Sig Dispense Refill    amphetamine-dextroamphetamine (ADDERALL) 20 mg tablet Take 1 tablet (20 mg total) by mouth 2 (two) times a day Max Daily Amount: 40 mg 60 tablet 0    estradiol (VIVELLE-DOT) 0 1 MG/24HR Place 1 patch on the skin 2 (two) times a week      lisinopril (ZESTRIL) 2 5 mg tablet Take 1 tablet (2 5 mg total) by mouth every morning 90 tablet 1    rOPINIRole (REQUIP) 0 5 mg tablet Take 0 5 mg by mouth 3 (three) times a day       No current facility-administered medications for this visit          Objective:    /90 (BP Location: Left arm, Patient Position: Sitting, Cuff Size: Standard)   Pulse 72   Temp 98 1 °F (36 7 °C) (Temporal)   Resp 12   Wt 69 4 kg (153 lb)   BMI 23 96 kg/m²        Physical Exam Constitutional: She appears well-developed and well-nourished  Cardiovascular: Normal rate and regular rhythm  Pulmonary/Chest: Effort normal and breath sounds normal    Abdominal: Soft  Bowel sounds are normal  There is no CVA tenderness  Assessment/Plan:    No problem-specific Assessment & Plan notes found for this encounter  Diagnoses and all orders for this visit:    Frequency of urination  -     POCT urine dip auto non-scope  -     Urine culture    Urinary tract infection symptoms  Comments:  pt finished 5 days of abx  will send out for cx before treating with more abx  increase fluids  start vit c             Patient Instructions     Urinary Tract Infection in Women, Ambulatory Care   GENERAL INFORMATION:   A urinary tract infection (UTI)  is caused by bacteria that get inside your urinary tract  Most bacteria that enter your urinary tract are expelled when you urinate  If the bacteria stay in your urinary tract, you may get an infection  Your urinary tract includes your kidneys, ureters, bladder, and urethra  Urine is made in your kidneys, and it flows from the ureters to the bladder  Urine leaves the bladder through the urethra  A UTI is more common in your lower urinary tract, which includes your bladder and urethra  Common symptoms include the following:   · Urinating more often or waking from sleep to urinate    · Pain or burning when you urinate    · Pain or pressure in your lower abdomen     · Urine that smells bad    · Blood in your urine    · Leaking urine  Seek immediate care for the following symptoms:   · Urinating very little or not at all    · Vomiting    · Shaking chills with a fever    · Side or back pain that gets worse  Treatment for a UTI  may include medicines to treat a bacterial infection  You may also need medicines to decrease pain and burning, or decrease the urge to urinate often  Prevent a UTI:   · Urinate when you feel the urge    Do not hold your urine  Urinate as soon as you feel you have to  · Drink liquids as directed  Ask how much liquid to drink each day and which liquids are best for you  You may need to drink more fluids than usual to help flush out the bacteria  Do not drink alcohol, caffeine, and citrus juices  These can irritate your bladder and increase your symptoms  · Apply heat  on your abdomen for 20 to 30 minutes every 2 hours for as many days as directed  Heat helps decrease discomfort and pressure in your bladder  Follow up with your healthcare provider as directed:  Write down your questions so you remember to ask them during your visits  CARE AGREEMENT:   You have the right to help plan your care  Learn about your health condition and how it may be treated  Discuss treatment options with your caregivers to decide what care you want to receive  You always have the right to refuse treatment  The above information is an  only  It is not intended as medical advice for individual conditions or treatments  Talk to your doctor, nurse or pharmacist before following any medical regimen to see if it is safe and effective for you  © 2014 [address] is for End User's use only and may not be sold, redistributed or otherwise used for commercial purposes  All illustrations and images included in CareNotes® are the copyrighted property of A D A M [employer]  or Toribio Galvez,

## 2018-05-29 LAB
BACTERIA UR CULT: NORMAL
Lab: NORMAL

## 2018-05-30 ENCOUNTER — TELEPHONE (OUTPATIENT)
Dept: FAMILY MEDICINE CLINIC | Facility: CLINIC | Age: 58
End: 2018-05-30

## 2018-05-30 NOTE — TELEPHONE ENCOUNTER
----- Message from Lucho Galvez DO sent at 5/29/2018  4:17 PM EDT -----  Please call patient    No evidence of bacteria in urine

## 2018-06-18 DIAGNOSIS — F98.8 ATTENTION DEFICIT DISORDER WITHOUT HYPERACTIVITY: ICD-10-CM

## 2018-06-18 RX ORDER — DEXTROAMPHETAMINE SACCHARATE, AMPHETAMINE ASPARTATE, DEXTROAMPHETAMINE SULFATE AND AMPHETAMINE SULFATE 5; 5; 5; 5 MG/1; MG/1; MG/1; MG/1
1 TABLET ORAL
Qty: 60 TABLET | Refills: 0 | Status: SHIPPED | OUTPATIENT
Start: 2018-06-18

## 2018-06-19 ENCOUNTER — TELEPHONE (OUTPATIENT)
Dept: FAMILY MEDICINE CLINIC | Facility: CLINIC | Age: 58
End: 2018-06-19

## 2018-06-20 ENCOUNTER — TELEPHONE (OUTPATIENT)
Dept: FAMILY MEDICINE CLINIC | Facility: CLINIC | Age: 58
End: 2018-06-20

## 2018-06-20 NOTE — TELEPHONE ENCOUNTER
Please see result note  She needs to make an appt with another provider to discuss   Elevated cholesterol

## 2018-06-20 NOTE — TELEPHONE ENCOUNTER
----- Message from Jennifer Whitley DO sent at 6/19/2018 11:20 AM EDT -----  Pt will need to follow up with another provider to discuss cbc and cholesterol

## 2018-06-21 ENCOUNTER — OFFICE VISIT (OUTPATIENT)
Dept: FAMILY MEDICINE CLINIC | Facility: CLINIC | Age: 58
End: 2018-06-21
Payer: COMMERCIAL

## 2018-06-21 ENCOUNTER — TELEPHONE (OUTPATIENT)
Dept: FAMILY MEDICINE CLINIC | Facility: CLINIC | Age: 58
End: 2018-06-21

## 2018-06-21 VITALS
RESPIRATION RATE: 12 BRPM | SYSTOLIC BLOOD PRESSURE: 120 MMHG | TEMPERATURE: 97.2 F | WEIGHT: 150 LBS | DIASTOLIC BLOOD PRESSURE: 80 MMHG | HEART RATE: 72 BPM | BODY MASS INDEX: 23.49 KG/M2

## 2018-06-21 DIAGNOSIS — I10 ESSENTIAL HYPERTENSION: Primary | ICD-10-CM

## 2018-06-21 DIAGNOSIS — E78.1 HYPERTRIGLYCERIDEMIA: ICD-10-CM

## 2018-06-21 DIAGNOSIS — E78.2 MIXED HYPERLIPIDEMIA: ICD-10-CM

## 2018-06-21 PROCEDURE — 3074F SYST BP LT 130 MM HG: CPT | Performed by: NURSE PRACTITIONER

## 2018-06-21 PROCEDURE — 99214 OFFICE O/P EST MOD 30 MIN: CPT | Performed by: NURSE PRACTITIONER

## 2018-06-21 PROCEDURE — 1036F TOBACCO NON-USER: CPT | Performed by: NURSE PRACTITIONER

## 2018-06-21 PROCEDURE — 3079F DIAST BP 80-89 MM HG: CPT | Performed by: NURSE PRACTITIONER

## 2018-06-21 RX ORDER — ESTRADIOL 1 MG/1
1 TABLET ORAL DAILY
COMMUNITY

## 2018-06-21 NOTE — TELEPHONE ENCOUNTER
Gino Siddiqui,     Patient said that you recommended for her to get fish oil supplements but she said she went to look for them and there are so many kinds with so many different things in them so she wanted to know exactly which ones she should get? Please call her back to advise

## 2018-06-21 NOTE — PROGRESS NOTES
Assessment/Plan:    1  Essential hypertension  Stable  Continue current meds  2  Mixed hyperlipidemia  - Lipid panel; Future  Start fish oil 2000mg daily  Heart healthy diet  Will repeat labs in 3-4 months    3  Hypertriglyceridemia  Fish oil caps  Heart healthy diet  Reviewed foods to avoid and literature provided  - Lipid panel; Future           Subjective:      Patient ID: Calvin Mart is a 59 y o  female who presents to follow up on labs  300-400mg natalia  Pt here to follow up on labs  States has been eating poorly for months  "gained a little weight and has been snacking and not eating healthy"  Denies chest pain, sob, headache, dizziness, n/v/d  Pt states would like to avoid chol meds if possible  Would rather make diet changes  The following portions of the patient's history were reviewed and updated as appropriate: allergies, current medications, past family history, past medical history, past social history, past surgical history and problem list     Review of Systems   Constitutional: Negative for activity change and unexpected weight change  Eyes: Negative for visual disturbance  Respiratory: Negative for cough, shortness of breath and wheezing  Cardiovascular: Negative for chest pain, palpitations and leg swelling  Gastrointestinal: Negative for abdominal pain, diarrhea, nausea and vomiting  Musculoskeletal: Negative for arthralgias and myalgias  Skin: Negative for pallor  Neurological: Negative for dizziness and headaches  Hematological: Negative for adenopathy  Objective:    cmp wnl, cbc wnl  Lipids: total chol 222, , hdl 65, ldl 91    /80 (BP Location: Left arm, Patient Position: Sitting, Cuff Size: Standard)   Pulse 72   Temp (!) 97 2 °F (36 2 °C) (Temporal)   Resp 12   Wt 68 kg (150 lb)   BMI 23 49 kg/m²          Physical Exam   Constitutional: She is oriented to person, place, and time  She appears well-developed and well-nourished  No distress  Neck:   No audible carotid bruit   Cardiovascular: Normal rate, regular rhythm and normal heart sounds  No murmur heard  Pulmonary/Chest: Breath sounds normal  No respiratory distress  She has no wheezes  Lymphadenopathy:     She has no cervical adenopathy  Neurological: She is alert and oriented to person, place, and time  Skin: Skin is warm and dry  No rash noted  No erythema  No pallor  Psychiatric: She has a normal mood and affect  Her behavior is normal  Judgment and thought content normal    Vitals reviewed

## 2018-06-21 NOTE — PATIENT INSTRUCTIONS
Start Fish oil capsules 1000mg - 2 caps daily (look for Omega 3, -400mg)  Heart healthy diet  Will repeat labs in 3-4 months  Heart Healthy Diet   AMBULATORY CARE:   A heart healthy diet  is an eating plan low in total fat, unhealthy fats, and sodium (salt)  A heart healthy diet helps decrease your risk for heart disease and stroke  Limit the amount of fat you eat to 25% to 35% of your total daily calories  Limit sodium to less than 2,300 mg each day  Healthy fats:  Healthy fats can help improve cholesterol levels  The risk for heart disease is decreased when cholesterol levels are normal  Choose healthy fats, such as the following:  · Unsaturated fat  is found in foods such as soybean, canola, olive, corn, and safflower oils  It is also found in soft tub margarine that is made with liquid vegetable oil  · Omega-3 fat  is found in certain fish, such as salmon, tuna, and trout, and in walnuts and flaxseed  Unhealthy fats:  Unhealthy fats can cause unhealthy cholesterol levels in your blood and increase your risk of heart disease  Limit unhealthy fats, such as the following:  · Cholesterol  is found in animal foods, such as eggs and lobster, and in dairy products made from whole milk  Limit cholesterol to less than 300 milligrams (mg) each day  You may need to limit cholesterol to 200 mg each day if you have heart disease  · Saturated fat  is found in meats, such as roa and hamburger  It is also found in chicken or turkey skin, whole milk, and butter  Limit saturated fat to less than 7% of your total daily calories  Limit saturated fat to less than 6% if you have heart disease or are at increased risk for it  · Trans fat  is found in packaged foods, such as potato chips and cookies  It is also in hard margarine, some fried foods, and shortening  Avoid trans fats as much as possible    Heart healthy foods and drinks to include:  Ask your dietitian or healthcare provider how many servings to have from each of the following food groups:  · Grains:      ¨ Whole-wheat breads, cereals, and pastas, and brown rice    ¨ Low-fat, low-sodium crackers and chips    · Vegetables:      ¨ Broccoli, green beans, green peas, and spinach    ¨ Collards, kale, and lima beans    ¨ Carrots, sweet potatoes, tomatoes, and peppers    ¨ Canned vegetables with no salt added    · Fruits:      ¨ Bananas, peaches, pears, and pineapple    ¨ Grapes, raisins, and dates    ¨ Oranges, tangerines, grapefruit, orange juice, and grapefruit juice    ¨ Apricots, mangoes, melons, and papaya    ¨ Raspberries and strawberries    ¨ Canned fruit with no added sugar    · Low-fat dairy products:      ¨ Nonfat (skim) milk, 1% milk, and low-fat almond, cashew, or soy milks fortified with calcium    ¨ Low-fat cheese, regular or frozen yogurt, and cottage cheese    · Meats and proteins , such as lean cuts of beef and pork (loin, leg, round), skinless chicken and turkey, legumes, soy products, egg whites, and nuts  Foods and drinks to limit or avoid:  Ask your dietitian or healthcare provider about these and other foods that are high in unhealthy fat, sodium, and sugar:  · Snack or packaged foods , such as frozen dinners, cookies, macaroni and cheese, and cereals with more than 300 mg of sodium per serving    · Canned or dry mixes  for cakes, soups, sauces, or gravies    · Vegetables with added sodium , such as instant potatoes, vegetables with added sauces, or regular canned vegetables    · Other foods high in sodium , such as ketchup, barbecue sauce, salad dressing, pickles, olives, soy sauce, and miso    · High-fat dairy foods  such as whole or 2% milk, cream cheese, or sour cream, and cheeses     · High-fat protein foods  such as high-fat cuts of beef (T-bone steaks, ribs), chicken or turkey with skin, and organ meats, such as liver    · Cured or smoked meats , such as hot dogs, roa, and sausage    · Unhealthy fats and oils , such as butter, stick margarine, shortening, and cooking oils such as coconut or palm oil    · Food and drinks high in sugar , such as soft drinks (soda), sports drinks, sweetened tea, candy, cake, cookies, pies, and doughnuts  Other diet guidelines to follow:   · Eat more foods containing omega-3 fats  Eat fish high in omega-3 fats at least 2 times a week  · Limit alcohol  Too much alcohol can damage your heart and raise your blood pressure  Women should limit alcohol to 1 drink a day  Men should limit alcohol to 2 drinks a day  A drink of alcohol is 12 ounces of beer, 5 ounces of wine, or 1½ ounces of liquor  · Choose low-sodium foods  High-sodium foods can lead to high blood pressure  Add little or no salt to food you prepare  Use herbs and spices in place of salt  · Eat more fiber  to help lower cholesterol levels  Eat at least 5 servings of fruits and vegetables each day  Eat 3 ounces of whole-grain foods each day  Legumes (beans) are also a good source of fiber  Lifestyle guidelines:   · Do not smoke  Nicotine and other chemicals in cigarettes and cigars can cause lung and heart damage  Ask your healthcare provider for information if you currently smoke and need help to quit  E-cigarettes or smokeless tobacco still contain nicotine  Talk to your healthcare provider before you use these products  · Exercise regularly  to help you maintain a healthy weight and improve your blood pressure and cholesterol levels  Ask your healthcare provider about the best exercise plan for you  Do not start an exercise program without asking your healthcare provider  Follow up with your healthcare provider as directed:  Write down your questions so you remember to ask them during your visits  © 2017 [address]  Information is for End User's use only and may not be sold, redistributed or otherwise used for commercial purposes   All illustrations and images included in CareNotes® are the copyrighted property of [employer] M [employer]  or Toribio Morillo  The above information is an  only  It is not intended as medical advice for individual conditions or treatments  Talk to your doctor, nurse or pharmacist before following any medical regimen to see if it is safe and effective for you

## 2018-07-17 DIAGNOSIS — F98.8 ATTENTION DEFICIT DISORDER WITHOUT HYPERACTIVITY: ICD-10-CM

## 2018-07-17 RX ORDER — DEXTROAMPHETAMINE SACCHARATE, AMPHETAMINE ASPARTATE, DEXTROAMPHETAMINE SULFATE AND AMPHETAMINE SULFATE 5; 5; 5; 5 MG/1; MG/1; MG/1; MG/1
1 TABLET ORAL
Qty: 60 TABLET | Refills: 0 | OUTPATIENT
Start: 2018-07-17

## 2018-07-17 NOTE — TELEPHONE ENCOUNTER
CALLED PT ADVISED SAME, SHE HAS BEEN TESTED FOR THIS MEDICATION FROM A PSYCHIATRIST AND HAS BEEN ON THIS MED FOR 8-10 YEARS, SHE WANTS TO KNOW IF DR REDDY WOULD REFILL IT FOR HER AND SHE SAID SHE WOULD MAKE AN APPT   TO SEE YOU, PT IS VERY FRUSTRATED, PLEASE ADVISE PT IS AWAITING MY RETURN CALL

## 2018-09-04 ENCOUNTER — TELEPHONE (OUTPATIENT)
Dept: FAMILY MEDICINE CLINIC | Facility: CLINIC | Age: 58
End: 2018-09-04

## 2018-09-04 DIAGNOSIS — I10 BENIGN ESSENTIAL HTN: ICD-10-CM

## 2018-09-04 DIAGNOSIS — F98.8 ATTENTION DEFICIT DISORDER WITHOUT HYPERACTIVITY: ICD-10-CM

## 2018-09-04 RX ORDER — LISINOPRIL 2.5 MG/1
2.5 TABLET ORAL EVERY MORNING
Qty: 90 TABLET | Refills: 0 | Status: SHIPPED | OUTPATIENT
Start: 2018-09-04 | End: 2018-12-03 | Stop reason: SDUPTHER

## 2018-09-04 RX ORDER — DEXTROAMPHETAMINE SACCHARATE, AMPHETAMINE ASPARTATE, DEXTROAMPHETAMINE SULFATE AND AMPHETAMINE SULFATE 5; 5; 5; 5 MG/1; MG/1; MG/1; MG/1
1 TABLET ORAL
Qty: 60 TABLET | Refills: 0 | Status: CANCELLED | OUTPATIENT
Start: 2018-09-04

## 2018-09-04 NOTE — TELEPHONE ENCOUNTER
Phone line rang and then disconnected - please advise patient of joseph's message and document the same

## 2018-09-04 NOTE — TELEPHONE ENCOUNTER
Patient would like to know where Dr Anderson Rape is now because she needs her Adderall that she has been on for 10 years but someone here told her that she has to see a psychiatrist to renew that but she can't get in there for months  Please call patient back to advise about Adderall situation

## 2018-09-04 NOTE — TELEPHONE ENCOUNTER
Please see previous conversation under refills    I have never seen her  She needs f/u for adderal refill   Additionally, I do not manage adderall but will give courtesy refills within reason until she establishes with psych

## 2018-09-04 NOTE — TELEPHONE ENCOUNTER
She needs appt with  I have never seen her before  I will give her courtesy refills but will not manage   She must establish with psych

## 2018-12-03 DIAGNOSIS — I10 BENIGN ESSENTIAL HTN: ICD-10-CM

## 2018-12-03 RX ORDER — LISINOPRIL 2.5 MG/1
2.5 TABLET ORAL EVERY MORNING
Qty: 90 TABLET | Refills: 0 | Status: SHIPPED | OUTPATIENT
Start: 2018-12-03

## 2023-08-25 NOTE — TELEPHONE ENCOUNTER
----- Message from Vicky Hsu sent at 8/25/2023 10:05 AM CDT -----  Contact: B[facility] Bear is calling in regards to COVID test being negative. Pt stated having another home test and is willing to bring if nurse wants to test again. Please call back at [phone]                              Thanks  KT     Pl, advise pt -  I do not Rx Adderall - Dr RIP blanc  -  Needs to find a different office for this Rx

## (undated) DEVICE — SINGLE-USE BIOPSY FORCEPS: Brand: RADIAL JAW 4

## (undated) DEVICE — BAG SPECIMEN BIOHAZARD 10 X 10 ADHESIVE

## (undated) DEVICE — BRUSH CYTOLOGY 3 MM 240 CM

## (undated) DEVICE — MARKER SPOT EX  BOWEL TATTOO SYRINGE

## (undated) DEVICE — 1200CC GUARDIAN II: Brand: GUARDIAN

## (undated) DEVICE — TRAP POLY

## (undated) DEVICE — FORCEP ELECSURG RADIAL JAW4 2.2 X 240CM  HOT BX

## (undated) DEVICE — TRAVELKIT CONTAINS FIRST STEP KIT (200ML EP-4 KIT) AND SOILED SCOPE BAG - 1 KIT: Brand: TRAVELKIT CONTAINS FIRST STEP KIT AND SOILED SCOPE BAG

## (undated) DEVICE — MEDI-VAC YANKAUER SUCTION HANDLE: Brand: CARDINAL HEALTH

## (undated) DEVICE — TUBING AUX CHANNEL

## (undated) DEVICE — SOLIDIFIER FLUID WASTE CONTROL 1500ML

## (undated) DEVICE — AIRLIFE™  ADULT CUSHION NASAL CANNULA WITH 7 FOOT (2.1 M) CRUSH-RESISTANT OXYGEN TUBING, AND U/CONNECT-IT ADAPTER: Brand: AIRLIFE™

## (undated) DEVICE — 60 ML SYRINGE,REGULAR TIP: Brand: MONOJECT

## (undated) DEVICE — LUBRICANT SURGILUBE TUBE 4 OZ  FLIP TOP

## (undated) DEVICE — SNARE BARBED 230CM

## (undated) DEVICE — Device: Brand: OLYMPUS

## (undated) DEVICE — DISPOSABLE BIOPSY VALVE MAJ-1555: Brand: SINGLE USE BIOPSY VALVE (STERILE)

## (undated) DEVICE — BRUSH ENDO CLEANING DBL-HEADER

## (undated) DEVICE — GAUZE SPONGES,16 PLY: Brand: CURITY

## (undated) DEVICE — GROUNDING PAD UNIVERSAL SLW

## (undated) DEVICE — TUBING BUBBLE CLEAR 5MM X 100 FT NS

## (undated) DEVICE — GLOVE EXAM NON-STRL NTRL PLUS LRG PF